# Patient Record
Sex: FEMALE | Race: WHITE | NOT HISPANIC OR LATINO | URBAN - METROPOLITAN AREA
[De-identification: names, ages, dates, MRNs, and addresses within clinical notes are randomized per-mention and may not be internally consistent; named-entity substitution may affect disease eponyms.]

---

## 2023-01-01 ENCOUNTER — NURSING HOME VISIT (OUTPATIENT)
Dept: POST ACUTE CARE | Facility: EXTERNAL LOCATION | Age: 88
End: 2023-01-01
Payer: MEDICARE

## 2023-01-01 DIAGNOSIS — J43.9 PULMONARY EMPHYSEMA, UNSPECIFIED EMPHYSEMA TYPE (MULTI): ICD-10-CM

## 2023-01-01 DIAGNOSIS — F10.27 SEVERE DEMENTIA ASSOCIATED WITH ALCOHOLISM, WITH OTHER BEHAVIORAL DISTURBANCE (MULTI): ICD-10-CM

## 2023-01-01 DIAGNOSIS — M19.90 CHRONIC OSTEOARTHRITIS: ICD-10-CM

## 2023-01-01 DIAGNOSIS — F41.9 ANXIETY: Primary | ICD-10-CM

## 2023-01-01 DIAGNOSIS — F41.9 ANXIETY: ICD-10-CM

## 2023-01-01 DIAGNOSIS — J43.9 PULMONARY EMPHYSEMA, UNSPECIFIED EMPHYSEMA TYPE (MULTI): Primary | ICD-10-CM

## 2023-01-01 PROCEDURE — 99307 SBSQ NF CARE SF MDM 10: CPT | Performed by: INTERNAL MEDICINE

## 2023-01-01 ASSESSMENT — ENCOUNTER SYMPTOMS
PALPITATIONS: 0
SHORTNESS OF BREATH: 0

## 2023-10-07 PROBLEM — F10.27: Status: ACTIVE | Noted: 2023-01-01

## 2023-10-07 PROBLEM — M19.90 CHRONIC OSTEOARTHRITIS: Status: ACTIVE | Noted: 2023-01-01

## 2023-10-07 PROBLEM — F41.9 ANXIETY: Status: ACTIVE | Noted: 2023-01-01

## 2023-10-07 PROBLEM — J43.9 PULMONARY EMPHYSEMA (MULTI): Status: ACTIVE | Noted: 2023-01-01

## 2023-10-07 NOTE — LETTER
Patient: Reyna Rangel  : 1929    Encounter Date: 10/07/2023    Subjective  Patient ID: Reyna Rangel is a 89 y.o. female who presents for No chief complaint on file..    Slow delcine, pleasently confused, eating, sleeping marginal per staff, no pain issues uncontrolled.         Review of Systems   Respiratory:  Negative for shortness of breath.    Cardiovascular:  Negative for chest pain and palpitations.   All other systems reviewed and are negative.      Objective  There were no vitals taken for this visit.    Physical Exam  Constitutional:       General: She is awake.      Comments: chacetic   HENT:      Head: Normocephalic and atraumatic.   Cardiovascular:      Rate and Rhythm: Normal rate and regular rhythm.   Pulmonary:      Breath sounds: Normal breath sounds.      Comments: Decreased breath sounds decreased.  Abdominal:      General: Bowel sounds are normal.      Palpations: Abdomen is soft.      Tenderness: There is no abdominal tenderness.   Musculoskeletal:      Cervical back: Normal range of motion and neck supple.      Right lower leg: No edema.      Left lower leg: No edema.   Neurological:      Comments: Weakness, bed bound         Assessment/Plan  Diagnoses and all orders for this visit:  Pulmonary emphysema, unspecified emphysema type (CMS/HCC)  Comments:  slow decline O2 dependent.  Severe dementia associated with alcoholism, with other behavioral disturbance (CMS/HCC)  Comments:  SLow decline on hopsice.  Anxiety  Comments:  SSRI.  Chronic osteoarthritis  Comments:  Tramadol as needed, bowels doing OK.             Electronically Signed By: Edgardo Jones MD   10/7/23 12:11 PM

## 2023-10-07 NOTE — PROGRESS NOTES
Subjective   Patient ID: Reyna Rangel is a 89 y.o. female who presents for No chief complaint on file..    Slow delcine, pleasently confused, eating, sleeping marginal per staff, no pain issues uncontrolled.         Review of Systems   Respiratory:  Negative for shortness of breath.    Cardiovascular:  Negative for chest pain and palpitations.   All other systems reviewed and are negative.      Objective   There were no vitals taken for this visit.    Physical Exam  Constitutional:       General: She is awake.      Comments: chacetic   HENT:      Head: Normocephalic and atraumatic.   Cardiovascular:      Rate and Rhythm: Normal rate and regular rhythm.   Pulmonary:      Breath sounds: Normal breath sounds.      Comments: Decreased breath sounds decreased.  Abdominal:      General: Bowel sounds are normal.      Palpations: Abdomen is soft.      Tenderness: There is no abdominal tenderness.   Musculoskeletal:      Cervical back: Normal range of motion and neck supple.      Right lower leg: No edema.      Left lower leg: No edema.   Neurological:      Comments: Weakness, bed bound         Assessment/Plan   Diagnoses and all orders for this visit:  Pulmonary emphysema, unspecified emphysema type (CMS/HCC)  Comments:  slow decline O2 dependent.  Severe dementia associated with alcoholism, with other behavioral disturbance (CMS/HCC)  Comments:  SLow decline on hopsice.  Anxiety  Comments:  SSRI.  Chronic osteoarthritis  Comments:  Tramadol as needed, bowels doing OK.

## 2023-11-04 NOTE — LETTER
Patient: Reyna Rangel  : 1929    Encounter Date: 2023    Subjective  Patient ID: Reyna Rangel is a 89 y.o. female who is long term resident being seen and evaluated for multiple medical problems.    COPD, anxiety slow decline hopsice, not eating much, comfortable.         Review of Systems   Reason unable to perform ROS: cognitive decline.       Objective  There were no vitals taken for this visit.    Physical Exam  Constitutional:       General: She is awake.      Comments: cachexia   HENT:      Head: Normocephalic and atraumatic.   Cardiovascular:      Rate and Rhythm: Normal rate and regular rhythm.   Pulmonary:      Breath sounds: Normal breath sounds.      Comments:  Bilaterally.  Abdominal:      General: Bowel sounds are normal.      Palpations: Abdomen is soft.      Tenderness: There is no abdominal tenderness.   Musculoskeletal:      Cervical back: Normal range of motion and neck supple.      Right lower leg: No edema.      Left lower leg: No edema.         Assessment/Plan  Diagnoses and all orders for this visit:  Anxiety  Comments:  slow decline  Severe dementia associated with alcoholism, with other behavioral disturbance (CMS/HCC)  Comments:  slow decline. appetite poor  Chronic osteoarthritis  Pulmonary emphysema, unspecified emphysema type (CMS/HCC)  Comments:  on O2 hospice slow decline       Goals    None           Electronically Signed By: Edgardo Jones MD   23 10:28 AM

## 2023-11-04 NOTE — PROGRESS NOTES
Subjective   Patient ID: Reyna Rangel is a 89 y.o. female who is long term resident being seen and evaluated for multiple medical problems.    COPD, anxiety slow decline hopsice, not eating much, comfortable.         Review of Systems   Reason unable to perform ROS: cognitive decline.       Objective   There were no vitals taken for this visit.    Physical Exam  Constitutional:       General: She is awake.      Comments: cachexia   HENT:      Head: Normocephalic and atraumatic.   Cardiovascular:      Rate and Rhythm: Normal rate and regular rhythm.   Pulmonary:      Breath sounds: Normal breath sounds.      Comments:  Bilaterally.  Abdominal:      General: Bowel sounds are normal.      Palpations: Abdomen is soft.      Tenderness: There is no abdominal tenderness.   Musculoskeletal:      Cervical back: Normal range of motion and neck supple.      Right lower leg: No edema.      Left lower leg: No edema.         Assessment/Plan   Diagnoses and all orders for this visit:  Anxiety  Comments:  slow decline  Severe dementia associated with alcoholism, with other behavioral disturbance (CMS/HCC)  Comments:  slow decline. appetite poor  Chronic osteoarthritis  Pulmonary emphysema, unspecified emphysema type (CMS/HCC)  Comments:  on O2 hospice slow decline       Goals    None

## 2023-12-02 NOTE — PROGRESS NOTES
Subjective   Patient ID: Reyna Rangel is a 89 y.o. female who is long term resident being seen and evaluated for multiple medical problems.    On hopsice slow delcine, no pain issues on exam or per staff, pleasently confused.         Review of Systems   Unable to perform ROS: Dementia       Objective   There were no vitals taken for this visit.    Physical Exam  Constitutional:       General: She is awake.      Comments: cachextic   HENT:      Head: Normocephalic and atraumatic.   Cardiovascular:      Rate and Rhythm: Normal rate and regular rhythm.   Pulmonary:      Breath sounds: Normal breath sounds.   Abdominal:      General: Bowel sounds are normal.      Palpations: Abdomen is soft.      Tenderness: There is no abdominal tenderness.   Musculoskeletal:      Cervical back: Normal range of motion and neck supple.      Right lower leg: No edema.      Left lower leg: No edema.   Neurological:      Motor: Weakness present.         Assessment/Plan   Diagnoses and all orders for this visit:  Anxiety  Comments:  stabel.  Severe dementia associated with alcoholism, with other behavioral disturbance (CMS/HCC)  Comments:  Hospcie slow decline, intake borderline.  Chronic osteoarthritis  Comments:  Yolanda meds, bowel prophylaxis  Pulmonary emphysema, unspecified emphysema type (CMS/HCC)  Comments:  slow decline O2       Goals    None

## 2023-12-02 NOTE — LETTER
Patient: Reyna Rangel  : 1929    Encounter Date: 2023    Subjective  Patient ID: Reyna Rangel is a 89 y.o. female who is long term resident being seen and evaluated for multiple medical problems.    On hopsice slow delcine, no pain issues on exam or per staff, pleasently confused.         Review of Systems   Unable to perform ROS: Dementia       Objective  There were no vitals taken for this visit.    Physical Exam  Constitutional:       General: She is awake.      Comments: cachextic   HENT:      Head: Normocephalic and atraumatic.   Cardiovascular:      Rate and Rhythm: Normal rate and regular rhythm.   Pulmonary:      Breath sounds: Normal breath sounds.   Abdominal:      General: Bowel sounds are normal.      Palpations: Abdomen is soft.      Tenderness: There is no abdominal tenderness.   Musculoskeletal:      Cervical back: Normal range of motion and neck supple.      Right lower leg: No edema.      Left lower leg: No edema.   Neurological:      Motor: Weakness present.         Assessment/Plan  Diagnoses and all orders for this visit:  Anxiety  Comments:  stabel.  Severe dementia associated with alcoholism, with other behavioral disturbance (CMS/HCC)  Comments:  Hospcie slow decline, intake borderline.  Chronic osteoarthritis  Comments:  Yolanda meds, bowel prophylaxis  Pulmonary emphysema, unspecified emphysema type (CMS/HCC)  Comments:  slow decline O2       Goals    None           Electronically Signed By: Edgardo Jones MD   23  9:54 AM

## 2024-01-01 ENCOUNTER — NURSING HOME VISIT (OUTPATIENT)
Dept: POST ACUTE CARE | Facility: EXTERNAL LOCATION | Age: 89
End: 2024-01-01
Payer: MEDICARE

## 2024-01-01 DIAGNOSIS — F10.27 SEVERE DEMENTIA ASSOCIATED WITH ALCOHOLISM, WITH OTHER BEHAVIORAL DISTURBANCE (MULTI): Primary | ICD-10-CM

## 2024-01-01 DIAGNOSIS — M19.90 CHRONIC OSTEOARTHRITIS: ICD-10-CM

## 2024-01-01 DIAGNOSIS — Z00.00 ROUTINE MEDICAL EXAM: Primary | ICD-10-CM

## 2024-01-01 DIAGNOSIS — J43.9 PULMONARY EMPHYSEMA, UNSPECIFIED EMPHYSEMA TYPE (MULTI): ICD-10-CM

## 2024-01-01 DIAGNOSIS — F41.9 ANXIETY: ICD-10-CM

## 2024-01-01 DIAGNOSIS — F10.27 SEVERE DEMENTIA ASSOCIATED WITH ALCOHOLISM, WITH OTHER BEHAVIORAL DISTURBANCE (MULTI): ICD-10-CM

## 2024-01-01 PROCEDURE — 99306 1ST NF CARE HIGH MDM 50: CPT | Performed by: INTERNAL MEDICINE

## 2024-01-01 PROCEDURE — 99307 SBSQ NF CARE SF MDM 10: CPT | Performed by: INTERNAL MEDICINE

## 2024-01-01 ASSESSMENT — PATIENT HEALTH QUESTIONNAIRE - PHQ9
2. FEELING DOWN, DEPRESSED OR HOPELESS: NEARLY EVERY DAY
SUM OF ALL RESPONSES TO PHQ9 QUESTIONS 1 AND 2: 6
1. LITTLE INTEREST OR PLEASURE IN DOING THINGS: NEARLY EVERY DAY

## 2024-01-01 ASSESSMENT — ENCOUNTER SYMPTOMS
DEPRESSION: 0
SHORTNESS OF BREATH: 0
PALPITATIONS: 0
OCCASIONAL FEELINGS OF UNSTEADINESS: 0
LOSS OF SENSATION IN FEET: 0

## 2024-01-08 NOTE — PATIENT INSTRUCTIONS
Diagnoses and all orders for this visit:  Routine medical exam  Severe dementia associated with alcoholism, with other behavioral disturbance (CMS/[facility])  Comments:  Severe dementia, on hopsice slow decline, discussed with aid should have someone hlep feed her.  Chronic osteoarthritis  Pulmonary emphysema, unspecified emphysema type (CMS/[facility])  Comments:  On hospice slow decline, no pain issues.  Anxiety

## 2024-01-08 NOTE — PROGRESS NOTES
Subjective   Patient ID: Reyna Rangel is a 89 y.o. female who is long term resident being seen and evaluated for multiple medical problems.    Wellness, on hospice, slow decline, poor nutrition. COPD,  Will need to make sure helped fed. No pain issues, pleasantly confused.         Review of Systems   Reason unable to perform ROS: dementia.       Objective   There were no vitals taken for this visit.    Physical Exam  Constitutional:       General: She is awake.      Comments: Cachexia.   HENT:      Head: Normocephalic and atraumatic.   Cardiovascular:      Rate and Rhythm: Normal rate and regular rhythm.   Pulmonary:      Breath sounds: Normal breath sounds.   Abdominal:      General: Bowel sounds are normal.      Palpations: Abdomen is soft.      Tenderness: There is no abdominal tenderness.   Musculoskeletal:      Cervical back: Normal range of motion and neck supple.      Right lower leg: No edema.      Left lower leg: No edema.         Assessment/Plan   Diagnoses and all orders for this visit:  Routine medical exam  Severe dementia associated with alcoholism, with other behavioral disturbance (CMS/HCC)  Comments:  Severe dementia, on hopsice slow decline, discussed with aid should have someone hlep feed her.  Chronic osteoarthritis  Pulmonary emphysema, unspecified emphysema type (CMS/HCC)  Comments:  On hospice slow decline, no pain issues.  Anxiety       Goals    None

## 2024-01-08 NOTE — LETTER
Patient: Reyna Rangel  : 1929    Encounter Date: 2024    Subjective  Patient ID: Reyna Rangel is a 89 y.o. female who is long term resident being seen and evaluated for multiple medical problems.    Wellness, on hospice, slow decline, poor nutrition. COPD,  Will need to make sure helped fed. No pain issues, pleasantly confused.         Review of Systems   Reason unable to perform ROS: dementia.       Objective  There were no vitals taken for this visit.    Physical Exam  Constitutional:       General: She is awake.      Comments: Cachexia.   HENT:      Head: Normocephalic and atraumatic.   Cardiovascular:      Rate and Rhythm: Normal rate and regular rhythm.   Pulmonary:      Breath sounds: Normal breath sounds.   Abdominal:      General: Bowel sounds are normal.      Palpations: Abdomen is soft.      Tenderness: There is no abdominal tenderness.   Musculoskeletal:      Cervical back: Normal range of motion and neck supple.      Right lower leg: No edema.      Left lower leg: No edema.         Assessment/Plan  Diagnoses and all orders for this visit:  Routine medical exam  Severe dementia associated with alcoholism, with other behavioral disturbance (CMS/HCC)  Comments:  Severe dementia, on hopsice slow decline, discussed with aid should have someone hlep feed her.  Chronic osteoarthritis  Pulmonary emphysema, unspecified emphysema type (CMS/HCC)  Comments:  On hospice slow decline, no pain issues.  Anxiety       Goals    None           Electronically Signed By: Edgardo Jones MD   24  2:00 PM

## 2024-02-19 NOTE — PROGRESS NOTES
Subjective   Patient ID: Reyna Rangel is a 89 y.o. female who is long term resident being seen and evaluated for multiple medical problems.     Hosoice slow decline, poor diet, COPD, anxiety , ALEX, mixed dementia. No pain.         Review of Systems   Respiratory:  Negative for shortness of breath.    Cardiovascular:  Negative for chest pain and palpitations.   All other systems reviewed and are negative.      Objective   There were no vitals taken for this visit.    Physical Exam  Constitutional:       General: She is awake.      Comments: cachexia   HENT:      Head: Normocephalic and atraumatic.   Cardiovascular:      Rate and Rhythm: Normal rate and regular rhythm.   Pulmonary:      Breath sounds: Normal breath sounds.   Abdominal:      General: Bowel sounds are normal.      Palpations: Abdomen is soft.      Tenderness: There is no abdominal tenderness.   Musculoskeletal:      Cervical back: Normal range of motion and neck supple.      Right lower leg: No edema.      Left lower leg: No edema.         Assessment/Plan   Diagnoses and all orders for this visit:  Severe dementia associated with alcoholism, with other behavioral disturbance (CMS/HCC)  Comments:  slow delcine, hopsice.  Chronic osteoarthritis  Pulmonary emphysema, unspecified emphysema type (CMS/HCC)  Comments:  slow delcine  Anxiety  Comments:  stable.       Goals    None

## 2024-02-19 NOTE — LETTER
Patient: Reyna Rangel  : 1929    Encounter Date: 2024    Subjective  Patient ID: Reyna Rangel is a 89 y.o. female who is long term resident being seen and evaluated for multiple medical problems.     Hosoice slow decline, poor diet, COPD, anxiety , ALEX, mixed dementia. No pain.         Review of Systems   Respiratory:  Negative for shortness of breath.    Cardiovascular:  Negative for chest pain and palpitations.   All other systems reviewed and are negative.      Objective  There were no vitals taken for this visit.    Physical Exam  Constitutional:       General: She is awake.      Comments: cachexia   HENT:      Head: Normocephalic and atraumatic.   Cardiovascular:      Rate and Rhythm: Normal rate and regular rhythm.   Pulmonary:      Breath sounds: Normal breath sounds.   Abdominal:      General: Bowel sounds are normal.      Palpations: Abdomen is soft.      Tenderness: There is no abdominal tenderness.   Musculoskeletal:      Cervical back: Normal range of motion and neck supple.      Right lower leg: No edema.      Left lower leg: No edema.         Assessment/Plan  Diagnoses and all orders for this visit:  Severe dementia associated with alcoholism, with other behavioral disturbance (CMS/HCC)  Comments:  slow delcine, hopsice.  Chronic osteoarthritis  Pulmonary emphysema, unspecified emphysema type (CMS/HCC)  Comments:  slow delcine  Anxiety  Comments:  stable.       Goals    None           Electronically Signed By: Edgardo Jones MD   24  1:57 PM